# Patient Record
Sex: FEMALE | Race: WHITE | NOT HISPANIC OR LATINO | URBAN - METROPOLITAN AREA
[De-identification: names, ages, dates, MRNs, and addresses within clinical notes are randomized per-mention and may not be internally consistent; named-entity substitution may affect disease eponyms.]

---

## 2022-06-04 ENCOUNTER — TELEPHONE ENCOUNTER (OUTPATIENT)
Dept: URBAN - METROPOLITAN AREA CLINIC 68 | Facility: CLINIC | Age: 62
End: 2022-06-04

## 2022-06-04 RX ORDER — POLYETHYLENE GLYCOL 3350, SODIUM SULFATE, SODIUM CHLORIDE, POTASSIUM CHLORIDE, ASCORBIC ACID, SODIUM ASCORBATE 7.5-2.691G
KIT ORAL AS DIRECTED
Qty: 1 | Refills: 0 | OUTPATIENT
Start: 2012-11-27 | End: 2012-11-28

## 2022-06-05 ENCOUNTER — TELEPHONE ENCOUNTER (OUTPATIENT)
Dept: URBAN - METROPOLITAN AREA CLINIC 68 | Facility: CLINIC | Age: 62
End: 2022-06-05

## 2022-06-05 RX ORDER — POLYETHYLENE GLYCOL 3350 17 G
FIBER SELECT GUMMIES(  ORAL  DAILY ) ACTIVE -HX ENTRY POWDER IN PACKET (EA) ORAL DAILY
Status: ACTIVE | COMMUNITY
Start: 2012-11-27

## 2022-06-05 RX ORDER — ESTRADIOL 0.5 MG/1
ESTRADIOL( 0.5MG ORAL  DAILY ) ACTIVE -HX ENTRY TABLET ORAL DAILY
Status: ACTIVE | COMMUNITY
Start: 2012-11-27

## 2022-06-05 RX ORDER — VALSARTAN 160 MG/1
DIOVAN( 160MG ORAL  DAILY ) ACTIVE -HX ENTRY TABLET ORAL DAILY
Status: ACTIVE | COMMUNITY
Start: 2012-11-27

## 2022-06-25 ENCOUNTER — TELEPHONE ENCOUNTER (OUTPATIENT)
Age: 62
End: 2022-06-25

## 2022-06-25 RX ORDER — POLYETHYLENE GLYCOL 3350, SODIUM SULFATE, SODIUM CHLORIDE, POTASSIUM CHLORIDE, ASCORBIC ACID, SODIUM ASCORBATE 7.5-2.691G
KIT ORAL AS DIRECTED
Qty: 1 | Refills: 0 | OUTPATIENT
Start: 2012-11-27 | End: 2012-11-28

## 2022-06-26 ENCOUNTER — TELEPHONE ENCOUNTER (OUTPATIENT)
Age: 62
End: 2022-06-26

## 2022-06-26 RX ORDER — VALSARTAN 160 MG/1
DIOVAN( 160MG ORAL  DAILY ) ACTIVE -HX ENTRY TABLET ORAL DAILY
Status: ACTIVE | COMMUNITY
Start: 2012-11-27

## 2022-06-26 RX ORDER — ESTRADIOL 0.5 MG/1
ESTRADIOL( 0.5MG ORAL  DAILY ) ACTIVE -HX ENTRY TABLET ORAL DAILY
Status: ACTIVE | COMMUNITY
Start: 2012-11-27

## 2022-06-26 RX ORDER — POLYETHYLENE GLYCOL 3350 17 G
FIBER SELECT GUMMIES(  ORAL  DAILY ) ACTIVE -HX ENTRY POWDER IN PACKET (EA) ORAL DAILY
Status: ACTIVE | COMMUNITY
Start: 2012-11-27

## 2022-06-26 RX ORDER — ERGOCALCIFEROL 1.25 MG/1
VITAMIN D( 400UNIT ORAL  DAILY ) ACTIVE -HX ENTRY CAPSULE ORAL DAILY
Status: ACTIVE | COMMUNITY
Start: 2012-11-27

## 2022-12-04 ENCOUNTER — TELEPHONE ENCOUNTER (OUTPATIENT)
Dept: URBAN - METROPOLITAN AREA CLINIC 68 | Facility: CLINIC | Age: 62
End: 2022-12-04

## 2023-01-11 ENCOUNTER — OFFICE VISIT (OUTPATIENT)
Dept: URBAN - METROPOLITAN AREA CLINIC 68 | Facility: CLINIC | Age: 63
End: 2023-01-11

## 2023-01-11 RX ORDER — TAMOXIFEN CITRATE 10 MG/1
1 TABLET TABLET ORAL TWICE A DAY
Status: ACTIVE | COMMUNITY

## 2023-01-11 RX ORDER — VALSARTAN 80 MG/1
DIOVAN( 160MG ORAL  DAILY ) ACTIVE -HX ENTRY TABLET ORAL DAILY
Status: ACTIVE | COMMUNITY
Start: 2012-11-27

## 2023-01-11 RX ORDER — LORATADINE 10 MG/1
1 TABLET TABLET ORAL ONCE A DAY
Status: ACTIVE | COMMUNITY

## 2023-01-11 RX ORDER — SODIUM PICOSULFATE, MAGNESIUM OXIDE, AND ANHYDROUS CITRIC ACID 10; 3.5; 12 MG/160ML; G/160ML; G/160ML
160 ML LIQUID ORAL ONCE
Qty: 1 KIT | OUTPATIENT

## 2023-01-11 RX ORDER — POLYETHYLENE GLYCOL 3350 17 G
FIBER _SELECT GUMMIES(  ORAL  DAILY ) ACTIVE -HX ENTRY POWDER IN PACKET (EA) ORAL DAILY
Status: DISCONTINUED | COMMUNITY
Start: 2012-11-27

## 2023-01-11 RX ORDER — SOD SULF/POT CHLORIDE/MAG SULF 1.479 G
AS DIRECTED TABLET ORAL ONCE
Qty: 1 KIT | OUTPATIENT

## 2023-01-11 RX ORDER — ESTRADIOL 0.5 MG/1
ESTRADIOL( 0.5MG ORAL  DAILY ) ACTIVE -HX ENTRY TABLET ORAL DAILY
Status: DISCONTINUED | COMMUNITY
Start: 2012-11-27

## 2023-01-11 NOTE — HPI-MIGRATED HPI
Transition to Care : 62 y.o. WF with history of hiatal hernia and esophageal stenosis who is here for evaluation of FABY. She did have an EGD and colonoscopy in 2012 with Dr. Castillo. She denies any gross gib. She was recommended to start oral iron which she could not tolerate due to constipation. She did have an EGD with dilation in the past and she reports having episodes of dysphagia with certain foods.

## 2023-02-06 ENCOUNTER — TELEPHONE ENCOUNTER (OUTPATIENT)
Dept: URBAN - METROPOLITAN AREA CLINIC 68 | Facility: CLINIC | Age: 63
End: 2023-02-06

## 2023-02-27 ENCOUNTER — OFFICE VISIT (OUTPATIENT)
Dept: URBAN - METROPOLITAN AREA SURGERY CENTER 12 | Facility: SURGERY CENTER | Age: 63
End: 2023-02-27

## 2023-04-07 ENCOUNTER — TELEPHONE ENCOUNTER (OUTPATIENT)
Dept: URBAN - METROPOLITAN AREA CLINIC 68 | Facility: CLINIC | Age: 63
End: 2023-04-07

## 2023-04-07 RX ORDER — SOD SULF/POT CHLORIDE/MAG SULF 1.479 G
AS DIRECTED TABLET ORAL ONCE
Qty: 1 KIT | COMMUNITY

## 2023-04-07 RX ORDER — SODIUM PICOSULFATE, MAGNESIUM OXIDE, AND ANHYDROUS CITRIC ACID 10; 3.5; 12 MG/160ML; G/160ML; G/160ML
160 ML LIQUID ORAL ONCE
Qty: 1 KIT | COMMUNITY

## 2023-04-07 RX ORDER — SODIUM PICOSULFATE, MAGNESIUM OXIDE, AND ANHYDROUS CITRIC ACID 10; 3.5; 12 MG/160ML; G/160ML; G/160ML
160 ML LIQUID ORAL ONCE
Qty: 1 KIT | OUTPATIENT

## 2023-04-07 RX ORDER — TAMOXIFEN CITRATE 10 MG/1
1 TABLET TABLET ORAL TWICE A DAY
COMMUNITY

## 2023-04-07 RX ORDER — LORATADINE 10 MG/1
1 TABLET TABLET ORAL ONCE A DAY
COMMUNITY

## 2023-04-07 RX ORDER — VALSARTAN 80 MG/1
DIOVAN( 160MG ORAL  DAILY ) ACTIVE -HX ENTRY TABLET ORAL DAILY
COMMUNITY
Start: 2012-11-27

## 2023-04-10 ENCOUNTER — OFFICE VISIT (OUTPATIENT)
Dept: URBAN - METROPOLITAN AREA SURGERY CENTER 12 | Facility: SURGERY CENTER | Age: 63
End: 2023-04-10

## 2023-05-18 ENCOUNTER — OFFICE VISIT (OUTPATIENT)
Dept: URBAN - METROPOLITAN AREA CLINIC 66 | Facility: CLINIC | Age: 63
End: 2023-05-18

## 2023-05-18 ENCOUNTER — DASHBOARD ENCOUNTERS (OUTPATIENT)
Age: 63
End: 2023-05-18

## 2023-05-18 RX ORDER — SODIUM PICOSULFATE, MAGNESIUM OXIDE, AND ANHYDROUS CITRIC ACID 10; 3.5; 12 MG/160ML; G/160ML; G/160ML
160 ML LIQUID ORAL ONCE
Qty: 1 KIT | Status: DISCONTINUED | COMMUNITY

## 2023-05-18 RX ORDER — SOD SULF/POT CHLORIDE/MAG SULF 1.479 G
AS DIRECTED TABLET ORAL ONCE
Qty: 1 KIT | COMMUNITY

## 2023-05-18 RX ORDER — SODIUM PICOSULFATE, MAGNESIUM OXIDE, AND ANHYDROUS CITRIC ACID 10; 3.5; 12 MG/160ML; G/160ML; G/160ML
160 ML THE FIRST DOSE THE EVENING BEFORE AND SECOND DOSE THE MORNING OF COLONOSCOPY LIQUID ORAL ONCE A DAY
Qty: 320 | OUTPATIENT
Start: 2023-05-18 | End: 2023-05-20

## 2023-05-18 RX ORDER — VALSARTAN 80 MG/1
DIOVAN( 160MG ORAL  DAILY ) ACTIVE -HX ENTRY TABLET ORAL DAILY
Status: ACTIVE | COMMUNITY
Start: 2012-11-27

## 2023-05-18 RX ORDER — LORATADINE 10 MG/1
1 TABLET TABLET ORAL ONCE A DAY
Status: ACTIVE | COMMUNITY

## 2023-05-18 RX ORDER — TAMOXIFEN CITRATE 10 MG/1
1 TABLET TABLET ORAL TWICE A DAY
Status: ACTIVE | COMMUNITY

## 2023-05-18 NOTE — EXAM-MIGRATED EXAMINATIONS
General Examination: Extremities: -> normal extremity with no clubbing, cyanosis or edema , normal extremity with no clubbing, cyanosis or edema    Neurologic: -> alert and oriented   Psych: -> alert and oriented x 3, normal affect / mood   Neck / thyroid: -> neck is supple, with full range of motion and no cervical lymphadenopathy   Skin: -> skin is warm and dry, with no rashes, good skin turgor and normal hair distribution   Heart: -> regular rate and rhythm without murmurs, gallops, clicks or rubs , regular rate and rhythm without murmurs, gallops, clicks or rubs   Lungs: -> clear to auscultation bilaterally, with good air movement and no rales, rhonchi or wheezes , clear to auscultation bilaterally, with good air movement and no rales, rhonchi or wheezes   Chest: -> chest wall with no costochondral junction tenderness, no rib deformity and normal shape and expansion   Abdomen: -> soft with good bowel sounds, nontender, and no masses or hepatosplenomegaly , negative Fleming's sign , _____   General appearance: -> alert, pleasant, well-nourished and in no acute distress , alert, pleasant, well-nourished and in no acute distress   Head: -> normocephalic, atraumatic   Eyes: -> normal , normal, sclera anicteric

## 2023-05-18 NOTE — HPI-MIGRATED HPI
Transition to Care : 62 y.o. WF with episodes of choking who does have a history of benign esophageal stenosis and history of hiatal hernia who is here for further management. She did have an EGD in 2012 with dilation with Dr. Castillo which did help with her symptoms. She is now having recurrent episodes of dysphagia and tries to clear her esophagus and may induce vomiting. She did have a colonoscopy in 2012 which showed IH. She denies any gib.

## 2023-06-05 ENCOUNTER — OFFICE VISIT (OUTPATIENT)
Dept: URBAN - METROPOLITAN AREA SURGERY CENTER 12 | Facility: SURGERY CENTER | Age: 63
End: 2023-06-05

## 2023-08-08 ENCOUNTER — OFFICE VISIT (OUTPATIENT)
Dept: URBAN - METROPOLITAN AREA SURGERY CENTER 12 | Facility: SURGERY CENTER | Age: 63
End: 2023-08-08

## 2023-10-10 ENCOUNTER — TELEPHONE ENCOUNTER (OUTPATIENT)
Dept: URBAN - METROPOLITAN AREA CLINIC 23 | Facility: CLINIC | Age: 63
End: 2023-10-10